# Patient Record
(demographics unavailable — no encounter records)

---

## 2025-02-12 NOTE — HISTORY OF PRESENT ILLNESS
[de-identified] : - Summary : Ms. Cisneros reported consistent pain subsequent to a stroke she had four years ago. The stroke affected her left side and as a result, she had been overusing her right side causing distress. She described a severe pain in her buttocks that sometimes interferes with her urinary function. In an attempt to alleviate her condition, she has seen several specialists, tried physical therapy, injections and medications, although none have proven effective. - Chief Complaint (CC) : Persistent pain in the left side of the body and in the lower back following a stroke four years ago. - History of Present Illness (HPI) : The patient suffered a non-clot related stroke four years ago that led to impairment on her left side. She developed chronic pain in her buttocks and lower back due to overuse of her right side. The pain has been progressively worsening and is interfering with her daily activities and urinary function. - Past Medical History : History of stroke, pain management treatments, and surgery for tear ligaments in a knee. - Past Surgical History : Ms. Cisneros had arthroscopic knee surgery a long time ago due to torn ligaments. - Family History : Not mentioned during the interaction - Social History : Does not have diabetes or any lifestyle-related issues that were discussed. - Review of Systems : Positive for neurological issues and pain in the lower back and left side, negative for heart, lung, gastrointestinal issues.  She has a UTI for which she is currently taking Augmentin - Medications : Past medications include 300mg Gabapentin and Tylenol.  Currently taking Augmentin for UTI

## 2025-02-12 NOTE — DISCUSSION/SUMMARY
[Medication Risks Reviewed] : Medication risks reviewed [de-identified] : Assessment :  - Summary : Ms. Cisneros is experiencing chronic right-sided leg and lower-back pain following a stroke four years ago, along with sciatica. The severity of the pain is affecting her daily functioning.   - Problems : Chronic left-sided pain,Lower-back pain,History of stroke,Degenerative change  - Differential Diagnosis : Osteoarthritis, neural compression ,Sciatica  Plan :  - Summary : I've suggested Ms. Bernard have an MRI of the lumbar spine for a more detailed insight into her condition. I've also proposed different treatment options, including surgery.  This would likely involve laminectomy and fusion from L3-L5.  As I am primarily a surgeon, I suggested that she consult other specialists who might offer different treatment modalities and a referral to a rehab physician was provided. I also prescribed her a 100mg Gabapentin for nerve pain and a steroid pack to help with inflammation. I've ordered the medications to her pharmacy, [facility] in [place].  - Plan : Order MRI lumbar spine, Consultation with other specialists,Prescription for Gabapentin and steroid pack

## 2025-02-12 NOTE — PHYSICAL EXAM
[Apractic] : apractic [Stooped] : stooped [Cane] : ambulates with cane [SLR] : positive straight leg raise [NL] : Motor strength of the right lower extremity was normal [___/5] : left ([unfilled]/5) [] : Sensory: [1+] : left ankle jerk 1+ [Obese] : obese [Plantar Reflex Right Only] : absent on the right [Plantar Reflex Left Only] : absent on the left [DTR Reflexes Clonus Of Right Ankle (___ Beats)] : absent on the right [DTR Reflexes Clonus Of Left Ankle (___ Beats)] : absent on the left [de-identified] : left sided hemiplegia [de-identified] : 4 views lumbar spine obtained today demonstrate no significant scoliosis.  Significant degeneration seen at the L3-4 and L4-5 levels with large osteophyte noted on the right side at L3-4 and to a lesser extent at L4-5.  On the lateral projection stooped forward posture is noted with positive sagittal balance appreciated.  Calcification of the abdominal aorta trace.  Significant degeneration at L3-4 and L4-5 with loss of disc height endplate sclerosis and anterior osteophytes most prominently at the L3-4 level and to a lesser extent at L4-5.  No dynamic instability between flexion-extension.  No acute fractures.  AP pelvis demonstrates normal appearance of hips bilaterally.  No acute fractures.  No significant degeneration.  ----  ACC: [id number] EXAM: CT ABDOMEN AND PELVIS ORDERED BY: MADELYN DC  PROCEDURE DATE: 02/07/2025  INTERPRETATION: CLINICAL INFORMATION: Right flank pain.  COMPARISON: None.  CONTRAST/COMPLICATIONS: IV Contrast: NONE Oral Contrast: NONE  PROCEDURE: CT of the Abdomen and Pelvis was performed. Sagittal and coronal reformats were performed.  FINDINGS:Absence of intravenous contrast limits evaluation of vasculature and visceral organs.  LOWER CHEST: Trace bibasilar dependent atelectasis.  LIVER: Within normal limits. BILE DUCTS: Normal caliber. GALLBLADDER: Within normal limits. SPLEEN: Within normal limits. PANCREAS: Within normal limits. ADRENALS: Within normal limits. KIDNEYS/URETERS: No hydronephrosis. Bilateral renal cysts as well as too small to characterize hypodensities. Nonobstructing 2 mm right renal calculus.  BLADDER: Within normal limits. REPRODUCTIVE ORGANS: Uterus and adnexa within normal limits.  BOWEL: No bowel obstruction. Nonvisualized appendix. PERITONEUM/RETROPERITONEUM: Within normal limits. VESSELS: Atherosclerotic changes. LYMPH NODES: No lymphadenopathy. ABDOMINAL WALL: Tiny fat-containing umbilical hernia. BONES: Degenerative changes.  IMPRESSION:  No hydronephrosis or obstructive uropathy.  Nonobstructing 2 mm right renal calculus.  --- End of Report ---  COLTON BROOKS MD; Resident Radiologist This document has been electronically signed. RADHA WOOTEN MD; Attending Radiologist This document has been electronically signed. Feb 7 2025 7:01AM